# Patient Record
Sex: MALE | Race: WHITE | NOT HISPANIC OR LATINO
[De-identification: names, ages, dates, MRNs, and addresses within clinical notes are randomized per-mention and may not be internally consistent; named-entity substitution may affect disease eponyms.]

---

## 2023-05-24 PROBLEM — Z00.00 ENCOUNTER FOR PREVENTIVE HEALTH EXAMINATION: Status: ACTIVE | Noted: 2023-05-24

## 2023-06-01 ENCOUNTER — RESULT REVIEW (OUTPATIENT)
Age: 48
End: 2023-06-01

## 2023-06-01 ENCOUNTER — APPOINTMENT (OUTPATIENT)
Dept: ORTHOPEDIC SURGERY | Facility: CLINIC | Age: 48
End: 2023-06-01
Payer: COMMERCIAL

## 2023-06-01 DIAGNOSIS — Z86.79 PERSONAL HISTORY OF OTHER DISEASES OF THE CIRCULATORY SYSTEM: ICD-10-CM

## 2023-06-01 DIAGNOSIS — Z78.9 OTHER SPECIFIED HEALTH STATUS: ICD-10-CM

## 2023-06-01 PROCEDURE — 73502 X-RAY EXAM HIP UNI 2-3 VIEWS: CPT

## 2023-06-01 PROCEDURE — 99203 OFFICE O/P NEW LOW 30 MIN: CPT

## 2023-06-01 RX ORDER — AMLODIPINE BESYLATE 5 MG/1
5 TABLET ORAL
Qty: 90 | Refills: 0 | Status: ACTIVE | COMMUNITY
Start: 2023-05-13

## 2023-06-01 RX ORDER — AMOXICILLIN AND CLAVULANATE POTASSIUM 875; 125 MG/1; MG/1
875-125 TABLET, COATED ORAL
Qty: 14 | Refills: 0 | Status: COMPLETED | COMMUNITY
Start: 2023-02-13

## 2023-06-01 RX ORDER — FLUTICASONE PROPIONATE 50 UG/1
50 SPRAY, METERED NASAL
Qty: 16 | Refills: 0 | Status: ACTIVE | COMMUNITY
Start: 2023-02-13

## 2023-06-01 NOTE — DISCUSSION/SUMMARY
[de-identified] : Options were discussed today including oral anti-inflammatories, Physical Therapy, Steroid Injection, or Surgery. The patient had time to ask questions of the different treatment options, including doing nothing and just observing for a finite period of time and re-evaluating in the future. \par \par Patient is indicated for R XIOMARA at this time. Patient would like to wait till after the summer for the surgery option. Plan at this time is to go forward with [facility]ACSI given referral today. Follow up 1 month. \par \par Entered by Nicole Valdez acting as scribe.\par Dr. Gibbs Attestation\par The documentation recorded by the scribe, in my presence, accurately reflects the service I, Dr. Gibbs, personally performed, and the decisions made by me with my edits as appropriate.

## 2023-06-01 NOTE — HISTORY OF PRESENT ILLNESS
[de-identified] : Patient presents in office with RT hip/groin pain. Pain does go into his inner thigh. NKI, getting worse for about a year now. \par 2/10 at rest and 8/10 active. PMHX: Brain brain aneurysm - sx went through the femoral artery on the RT side.   Patient notes pain is worse with crossing legs or getting out of car. He has NOT tried any NSAIDS, PT, CSI IA

## 2023-06-01 NOTE — PHYSICAL EXAM
[Right] : right hip with pelvis [de-identified] : Constitutional: The patient appears well developed, well nourished. Examination of patients ability to communicate functionally was normal. \par \par Neurologic: Coordination is normal. Alert and oriented to time, place and person. No evidence of mood disorder, calm affect. \par \par    LEFT   HIP/THIGH : Inspection of the hip/thigh is as follows: Inspection shows no swelling, no ecchymosis, no erythema, no rashes and no masses. \par \par Palpation of the hip/thigh is as follows: groin tenderness. no palpable defects and no palpable masses. \par \par Range of motion of the hip is as follows in degrees: \par \par Flexion: 90  \par Abduction:  20  \par External rotation:  10 \par Internal rotaion:  5\par \par Stength testing of the hip/thigh is as follows:\par Hip flexion strength:   5/5 \par Hip extension strength:  5/5 \par Hip abductionstrength:  5/5\par Hip adductionstrength:  5/5\par \par Neurological testing of the hip/thigh is as follows: motor exam 5/5 throughout, light touch intact throughout and no focal motor defecits. \par \par Gait and function is as follows: mildly antalgic gait. \par   [FreeTextEntry9] : ap/lat show mod to severe superior joint space narrowing, no frx noted

## 2023-06-05 ENCOUNTER — TRANSCRIPTION ENCOUNTER (OUTPATIENT)
Age: 48
End: 2023-06-05

## 2023-07-24 ENCOUNTER — APPOINTMENT (OUTPATIENT)
Dept: ORTHOPEDIC SURGERY | Facility: CLINIC | Age: 48
End: 2023-07-24
Payer: COMMERCIAL

## 2023-07-24 VITALS — HEIGHT: 69 IN | WEIGHT: 215 LBS | BODY MASS INDEX: 31.84 KG/M2

## 2023-07-24 PROCEDURE — 99213 OFFICE O/P EST LOW 20 MIN: CPT

## 2023-07-24 NOTE — DISCUSSION/SUMMARY
[de-identified] : He will observe for now. f/u PRN Recommend activity modification/low impact exercise

## 2023-07-24 NOTE — HISTORY OF PRESENT ILLNESS
[de-identified] : Patient is f/u on the right hip. Patient had IA CSI with Interventional Rad on 6/1/23. Patient reports that they got 90-95% relief at this time. Patient denies any other treatment beyond the csi and denies medications.

## 2023-09-25 ENCOUNTER — APPOINTMENT (OUTPATIENT)
Dept: ORTHOPEDIC SURGERY | Facility: CLINIC | Age: 48
End: 2023-09-25
Payer: COMMERCIAL

## 2023-09-25 PROCEDURE — 99214 OFFICE O/P EST MOD 30 MIN: CPT

## 2023-12-12 ENCOUNTER — APPOINTMENT (OUTPATIENT)
Dept: ORTHOPEDIC SURGERY | Facility: HOSPITAL | Age: 48
End: 2023-12-12
Payer: COMMERCIAL

## 2023-12-12 PROCEDURE — 27130 TOTAL HIP ARTHROPLASTY: CPT | Mod: AS,RT

## 2023-12-12 PROCEDURE — 20610 DRAIN/INJ JOINT/BURSA W/O US: CPT | Mod: 59,RT

## 2023-12-12 PROCEDURE — 27130 TOTAL HIP ARTHROPLASTY: CPT | Mod: RT

## 2023-12-20 ENCOUNTER — APPOINTMENT (OUTPATIENT)
Dept: ORTHOPEDIC SURGERY | Facility: CLINIC | Age: 48
End: 2023-12-20
Payer: COMMERCIAL

## 2023-12-20 PROCEDURE — 73502 X-RAY EXAM HIP UNI 2-3 VIEWS: CPT

## 2023-12-20 PROCEDURE — 99024 POSTOP FOLLOW-UP VISIT: CPT

## 2023-12-20 NOTE — DISCUSSION/SUMMARY
[de-identified] : He will continue the TEDS for 3 more weeks as well as the ASA BID He can remove the mesh next Tues. continue PT WBAT with hip precautions and f/u in 4-6 weeks.    Patient will return to work in 6 weeks  Entered by Curly Meraz acting as scribe. Dr. Gibbs Attestation The documentation recorded by the scribe, in my presence, accurately reflects the service I, Dr. Gibbs, personally performed, and the decisions made by me with my edits as appropriate.  Declines

## 2023-12-20 NOTE — HISTORY OF PRESENT ILLNESS
[de-identified] : Patient is f/u Rt XIOMARA 12/12/23. Patient denies fevers chills SOB. Patient reprots PT at home, has stopped using oxy at this time. Ambulting with Cane today.  Patient has been taking his ASA BID and using the TEDS.

## 2023-12-20 NOTE — PHYSICAL EXAM
[de-identified] : Constitutional: The patient appears well developed, well nourished.       Neurologic: Coordination is normal. Alert and oriented to time, place and person. No evidence of mood disorder, calm affect.         RIGHT    HIP/THIGH:  Inspection of the hip/thigh is as follows: Inspection shows mild swelling, ecchymosis laterally and ecchymosis over buttock. Inspection shows no erythema and no rashes.    Inspection of the wound reveals clean and dry incision, no fluctuance, no sign of infection, no erythema and no drainage. Adhesive mesh removed. Wound C/D/I       Palpation of the hip/thigh is as follows: Thigh/calf soft NT       Range of motion of the hip is as follows in degrees:    Flexion:  75    Abduction:  20   External rotation:  30      Strength testing of the hip/thigh is as follows:    Hip flexion strength:   5/5   Hip extension strength:  5/5    Hip abduction strength:  5/5     Hip adduction strength:  5/5      Neurological testing of the hip/thigh is as follows: motor exam 5/5 throughout, light touch intact throughout and no focal motor defecits.       Gait and function is as follows: uses CANE    Vascularity of the hip/thigh is as follows: Dorsalis pedis 2+ and Posterior tibial 2+      [Right] : right hip with pelvis [Components well fixed, in good position] : Components well fixed, in good position

## 2024-01-15 ENCOUNTER — APPOINTMENT (OUTPATIENT)
Dept: ORTHOPEDIC SURGERY | Facility: CLINIC | Age: 49
End: 2024-01-15
Payer: COMMERCIAL

## 2024-01-15 PROCEDURE — 99024 POSTOP FOLLOW-UP VISIT: CPT

## 2024-01-15 NOTE — PHYSICAL EXAM
[de-identified] : Constitutional: The patient appears well developed, well nourished.       Neurologic: Coordination is normal. Alert and oriented to time, place and person. No evidence of mood disorder, calm affect.         RIGHT    HIP/THIGH:  Inspection of the hip/thigh is as follows: Inspection shows mild swelling, NO  ecchymosis laterally and NO ecchymosis over buttock. Inspection shows no erythema and no rashes.    Inspection of the wound reveals WOUND IS WELL HEALED> NO SIGNS OF INFECTION OR SUTURE OR ABSCESS  Palpation of the hip/thigh is as follows: Thigh/calf soft NT       Range of motion of the hip is as follows in degrees:    Flexion:  100    Abduction:  35   External rotation:  35     Strength testing of the hip/thigh is as follows:    Hip flexion strength:   5/5   Hip extension strength:  5/5    Hip abduction strength:  5/5     Hip adduction strength:  5/5      Neurological testing of the hip/thigh is as follows: motor exam 5/5 throughout, light touch intact throughout and no focal motor defecits.       Gait and function is as follows: mild antalgic gait waddling mild     Vascularity of the hip/thigh is as follows: Dorsalis pedis 2+ and Posterior tibial 2+

## 2024-01-15 NOTE — HISTORY OF PRESENT ILLNESS
[de-identified] : following up for the right hip. Patient is s/p R XIOMARA 12/12/2023.  Patient states the paiun he had in the groin pre op has resolved. He notes a sharp pain lateral hip superficially.  He denies any fever chills or drainage.  He has been working with PT on stregnthening the quad and stretching the adductors.

## 2024-02-02 RX ORDER — AMOXICILLIN 500 MG/1
500 TABLET, FILM COATED ORAL
Qty: 4 | Refills: 2 | Status: ACTIVE | COMMUNITY
Start: 2024-02-02 | End: 1900-01-01

## 2024-03-11 ENCOUNTER — APPOINTMENT (OUTPATIENT)
Dept: ORTHOPEDIC SURGERY | Facility: CLINIC | Age: 49
End: 2024-03-11
Payer: COMMERCIAL

## 2024-03-11 PROCEDURE — 99024 POSTOP FOLLOW-UP VISIT: CPT

## 2024-03-11 RX ORDER — NAPROXEN 500 MG/1
500 TABLET ORAL
Qty: 40 | Refills: 0 | Status: ACTIVE | COMMUNITY
Start: 2024-03-11 | End: 1900-01-01

## 2024-03-11 NOTE — DISCUSSION/SUMMARY
[de-identified] : Discussed knee pain occasionally, will monitor. He will continue PT and he will try Nap 500 mg and f/u in 2 mos.

## 2024-03-11 NOTE — HISTORY OF PRESENT ILLNESS
[de-identified] : following up for the right hip. Patient is s/p R XIOMARA 12/12/2023.  Patient states the Right leg feels longer than the Right.  he has noted some stiffness and a little pain suprapatellar.  He states pain was pretty significant approx 1 week.  He did not try any NSAIDS.  He states hip pain at this point is minimal.

## 2024-03-11 NOTE — PHYSICAL EXAM
[de-identified] : Constitutional: The patient appears well developed, well nourished.       Neurologic: Coordination is normal. Alert and oriented to time, place and person. No evidence of mood disorder, calm affect.         RIGHT    HIP/THIGH:  Inspection of the hip/thigh is as follows:   Palpation of the hip/thigh is as follows: Thigh/calf soft NT       Range of motion of the hip is as follows in degrees:    Flexion:  100    Abduction:  35   External rotation:  35     Strength testing of the hip/thigh is as follows:    Hip flexion strength:   5/5   Hip extension strength:  5/5    Hip abduction strength:  5/5     Hip adduction strength:  5/5      Neurological testing of the hip/thigh is as follows: motor exam 5/5 throughout, light touch intact throughout and no focal motor defecits.       Gait and function is as follows: mild antalgic gait waddling mild     Vascularity of the hip/thigh is as follows: Dorsalis pedis 2+ and Posterior tibial 2+    Constitutional: The patient appears well developed, well nourished. Examination of patients ability to communicate functionally was normal.       Neurologic: Coordination is normal. Alert and oriented to time, place and person. No evidence of mood disorder, calm affect.           RIGHT  KNEE: Inspection of the knee is as follows: NO  effusion. no ecchymosis, no streaking, no erythema, no atrophy, no deformities of the quad tendon and no deformities of patellar tendon.       Palpation of the knee is as follows: NO  tenderness. no obvious defects, no palpable masses, no increased warmth and no crepitus.       Knee Range of Motion is as follows in degrees:       Extension: 0    Flexion: 125      Strength examination of the knee is as follows:       Quadriceps strength is 5/5   Hamstring strength is 5/5       Ligament Stability and Special Test: NEG  McMurrays test. ligamentously stable, negative anterior draw, negative Lachman test, negative posterior draw and no varus or valgus instability. patella tracks well and able to do active straight leg raise without an extensor lag.       Neurological examination of the knee is as follows: light touch is intact throughout.       Gait and function is as follows: non-antalgic gait.

## 2024-05-23 ENCOUNTER — APPOINTMENT (OUTPATIENT)
Dept: ORTHOPEDIC SURGERY | Facility: CLINIC | Age: 49
End: 2024-05-23
Payer: COMMERCIAL

## 2024-05-23 DIAGNOSIS — M16.11 UNILATERAL PRIMARY OSTEOARTHRITIS, RIGHT HIP: ICD-10-CM

## 2024-05-23 PROCEDURE — 99213 OFFICE O/P EST LOW 20 MIN: CPT

## 2024-05-23 NOTE — DISCUSSION/SUMMARY
[de-identified] : He understands he discomfort in the hip could take up to 1 yr to resolve. He will f/u in December for repeat x-rays. Patient was instructed to take antibiotic prophylaxis prior to any dental or GI procedures.

## 2024-05-23 NOTE — HISTORY OF PRESENT ILLNESS
[de-identified] : following up for the right hip. Patient is s/p R XIOMARA 12/12/2023. He states he has been feeling better, states he feels better than pre-op but still experiences discomfort at times.

## 2024-05-23 NOTE — PHYSICAL EXAM
[de-identified] : Constitutional: The patient appears well developed, well nourished.       Neurologic: Coordination is normal. Alert and oriented to time, place and person. No evidence of mood disorder, calm affect.         RIGHT    HIP/THIGH:  Inspection of the hip/thigh is as follows:   Palpation of the hip/thigh is as follows: Thigh/calf soft NT       Range of motion of the hip is as follows in degrees:    Flexion:  100    Abduction:  35   External rotation:  35     Strength testing of the hip/thigh is as follows:    Hip flexion strength:   5/5   Hip extension strength:  5/5    Hip abduction strength:  5/5     Hip adduction strength:  5/5      Neurological testing of the hip/thigh is as follows: motor exam 5/5 throughout, light touch intact throughout and no focal motor defecits.       Gait and function is as follows: mild antalgic gait waddling mild     Vascularity of the hip/thigh is as follows: Dorsalis pedis 2+ and Posterior tibial 2+    Constitutional: The patient appears well developed, well nourished. Examination of patients ability to communicate functionally was normal.       Neurologic: Coordination is normal. Alert and oriented to time, place and person. No evidence of mood disorder, calm affect.           RIGHT  KNEE: Inspection of the knee is as follows: NO  effusion. no ecchymosis, no streaking, no erythema, no atrophy, no deformities of the quad tendon and no deformities of patellar tendon.       Palpation of the knee is as follows: NO  tenderness. no obvious defects, no palpable masses, no increased warmth and no crepitus.       Knee Range of Motion is as follows in degrees:       Extension: 0    Flexion: 125      Strength examination of the knee is as follows:       Quadriceps strength is 5/5   Hamstring strength is 5/5       Ligament Stability and Special Test: NEG  McMurrays test. ligamentously stable, negative anterior draw, negative Lachman test, negative posterior draw and no varus or valgus instability. patella tracks well and able to do active straight leg raise without an extensor lag.       Neurological examination of the knee is as follows: light touch is intact throughout.       Gait and function is as follows: non-antalgic gait.